# Patient Record
Sex: FEMALE | Race: WHITE | NOT HISPANIC OR LATINO | Employment: FULL TIME | URBAN - METROPOLITAN AREA
[De-identification: names, ages, dates, MRNs, and addresses within clinical notes are randomized per-mention and may not be internally consistent; named-entity substitution may affect disease eponyms.]

---

## 2017-05-30 ENCOUNTER — APPOINTMENT (EMERGENCY)
Dept: RADIOLOGY | Facility: HOSPITAL | Age: 57
End: 2017-05-30
Payer: COMMERCIAL

## 2017-05-30 ENCOUNTER — HOSPITAL ENCOUNTER (EMERGENCY)
Facility: HOSPITAL | Age: 57
Discharge: HOME | End: 2017-05-30
Attending: EMERGENCY MEDICINE
Payer: COMMERCIAL

## 2017-05-30 VITALS
SYSTOLIC BLOOD PRESSURE: 193 MMHG | TEMPERATURE: 98 F | OXYGEN SATURATION: 96 % | RESPIRATION RATE: 20 BRPM | DIASTOLIC BLOOD PRESSURE: 93 MMHG | HEART RATE: 79 BPM | WEIGHT: 190 LBS

## 2017-05-30 DIAGNOSIS — M25.512 ACUTE PAIN OF LEFT SHOULDER: Primary | ICD-10-CM

## 2017-05-30 PROCEDURE — 99283 EMERGENCY DEPT VISIT LOW MDM: CPT

## 2017-05-30 PROCEDURE — 73060 X-RAY EXAM OF HUMERUS: CPT

## 2017-05-30 RX ORDER — IBUPROFEN 400 MG/1
400 TABLET ORAL ONCE
Status: COMPLETED | OUTPATIENT
Start: 2017-05-30 | End: 2017-05-30

## 2017-05-30 RX ORDER — OXYCODONE HYDROCHLORIDE AND ACETAMINOPHEN 5; 325 MG/1; MG/1
1 TABLET ORAL EVERY 6 HOURS PRN
Qty: 15 TABLET | Refills: 0 | Status: SHIPPED | OUTPATIENT
Start: 2017-05-30 | End: 2017-06-02

## 2017-05-30 RX ORDER — IBUPROFEN 400 MG/1
TABLET ORAL
Status: COMPLETED
Start: 2017-05-30 | End: 2017-05-30

## 2017-05-30 RX ORDER — DILTIAZEM HYDROCHLORIDE 240 MG/1
240 CAPSULE, COATED, EXTENDED RELEASE ORAL DAILY
COMMUNITY

## 2017-05-30 RX ADMIN — IBUPROFEN 400 MG: 400 TABLET ORAL at 09:28

## 2018-01-16 ENCOUNTER — ALLSCRIPTS OFFICE VISIT (OUTPATIENT)
Dept: OTHER | Facility: OTHER | Age: 58
End: 2018-01-16

## 2018-01-16 ENCOUNTER — GENERIC CONVERSION - ENCOUNTER (OUTPATIENT)
Dept: OTHER | Facility: OTHER | Age: 58
End: 2018-01-16

## 2018-01-17 NOTE — PROGRESS NOTES
Assessment   1  Hand dermatitis (692 9) (L30 9)    Plan    · Follow-up PRN Evaluation and Treatment  Follow-up  Status: Complete  Done:    [id number] 08:46AM    Discussion/Summary   Discussion Summary:    Hand dermatitis psoriasiform in nature question of a contact related process again will go ahead and treat with topical steroids follow-up if no improvement advised patient not to use Neosporin on the area  If this process keeps recurring will probably need to do patch testing to rule out potential contactant  Nothing else of concern noted on cursory exam       Chief Complaint   Chief Complaint Free Text Note Form: Patient is here for raw hands  Patient did want a gown  History of Present Illness   HPI: 27-year-old female had not seen for year and a half presents for follow-up for problems with her hand dermatitis  Patient notes that this process has been under control until recently over the last 2 weeks flared up has used some Neosporin and topical moisturizes creams without any improvement  No previous history of psoriasis no previous history of any other issue  No specific different activity that she can relate to the start of this process      Review of Systems   Complete Female Dermatology [facility] Patient:      Constitutional: Denies constitutional symptoms  Eyes: Denies eye symptoms  ENT:  denies ear symptoms, nasal symptoms, mouth or throat symptoms  Cardiovascular: Denies cardiovascular symptoms  Respiratory: Denies respiratory symptoms  Gastrointestinal: Denies gastrointestinal symptoms  Musculoskeletal: Denies musculoskeletal symptoms  Integumentary: Denies skin, hair and nail symptoms  Neurological: Denies neurologic symptoms  Psychiatric: Denies psychiatric symptoms  Endocrine: Denies endocrine symptoms  Hematologic/Lymphatic: Denies hematologic symptoms  Active Problems   1  Contact dermatitis due to chemicals (692 4) (L25 3)   2  Hand dermatitis (692 9) (L30 9)   3  Multiple acquired skin tags (701 9) (L91 8)   4  Multiple nevi (216 9) (D22 9)   5  Post-inflammatory hyperpigmentation (709 00) (L81 0)   6  Screening for skin condition (V82 0) (Z13 89)    Past Medical History   1  History of asthma (V12 69) (Z87 09)   2  History of hypercholesterolemia (V12 29) (Z86 39)   3  History of hypertension (V12 59) (Z86 79)   4  History of seasonal allergies (V15 09) (Z88 9)  Past Medical History Reviewed- Derm:    The past medical history was reviewed  Surgical History   1  History of Rectocele Repair  Surgical History Reviewed [facility]- D[facility]:    Surgical History reviewed      Family History   Mother    1  Family history of hypertension (V17 49) (Z82 49)  Family History Reviewed- Derm:    Family History was reviewed      Social History    · Former smoker (O24 75) (F86 038)  Social History Reviewed [facility]- Derm: The social history was reviewed      Current Meds    1  DilTIAZem HCl TABS; Therapy: (Recorded:23Nov2015) to Recorded   2  Fluocinonide-E 0 05 % CREA; APPLY TO AFFECTED AREA SPARINGLY TWICE A DAY     hands; Therapy: [id number] to (Last Rx:02Nov2016)  Requested for: [id number] Ordered  Medication List Reviewed: The medication list was reviewed and updated today  Allergies   1  No Known Drug Allergies    Physical Exam        Constitutional      General appearance: Appears healthy and well developed  Lymphatic      No visible disturbance  Musculoskeletal      Digits and nails: No clubbing, cyanosis or edema  Cutaneous and nail exam normal        Skin      Head: Normal turgor, no rashes, no lesions  Left upper extremity: Abnormal        Neuro/Psych      Alert and oriented x 3  Displays comfort and cooperation during encounterl  Affect is normal        Finding Erythema scaling well-demarcated patches noted on the left dorsum of the hand no other evidence of rash elsewhere        Signatures    Electronically signed by : JONI Moon ; Jan 16 2018  8:48AM EST                       (Author)

## 2018-06-12 ENCOUNTER — HOSPITAL ENCOUNTER (EMERGENCY)
Facility: HOSPITAL | Age: 58
Discharge: HOME | End: 2018-06-12
Attending: EMERGENCY MEDICINE | Admitting: EMERGENCY MEDICINE
Payer: COMMERCIAL

## 2018-06-12 VITALS
HEIGHT: 66 IN | TEMPERATURE: 98.5 F | SYSTOLIC BLOOD PRESSURE: 150 MMHG | RESPIRATION RATE: 17 BRPM | HEART RATE: 88 BPM | WEIGHT: 189.4 LBS | DIASTOLIC BLOOD PRESSURE: 100 MMHG | OXYGEN SATURATION: 97 % | BODY MASS INDEX: 30.44 KG/M2

## 2018-06-12 DIAGNOSIS — S91.331A: Primary | ICD-10-CM

## 2018-06-12 PROCEDURE — 90471 IMMUNIZATION ADMIN: CPT

## 2018-06-12 PROCEDURE — 90715 TDAP VACCINE 7 YRS/> IM: CPT | Performed by: PHYSICIAN ASSISTANT

## 2018-06-12 PROCEDURE — 99283 EMERGENCY DEPT VISIT LOW MDM: CPT

## 2018-06-12 RX ADMIN — TETANUS TOXOID, REDUCED DIPHTHERIA TOXOID AND ACELLULAR PERTUSSIS VACCINE, ADSORBED 0.5 ML: 5; 2.5; 8; 8; 2.5 SUSPENSION INTRAMUSCULAR at 21:45

## 2018-06-13 NOTE — ED PROVIDER NOTES
History  Chief Complaint   Patient presents with    Foot Injury     Pt reports stepping on an old nail at home, would like to receive tetanus shot  59-year-old female with foot injury  States prior to arrival a sergo nail punctured through her shoe and nicked the skin, right foot  There is no bleeding afterwards  She washed out afterwards  She has had no redness swelling or pain since that time  She states she is only here because she wants a tetanus shot  She does not know when she last had her tetanus shot  History provided by:  Patient   used: No    Foot Injury - Major   Location:  Foot  Time since incident:  2 hours  Injury: yes    Foot location:  Sole of R foot  Pain details:     Severity:  No pain  Chronicity:  New  Dislocation: no    Foreign body present:  No foreign bodies  Tetanus status:  Unknown  Prior injury to area:  No  Relieved by:  Nothing  Worsened by:  Nothing  Ineffective treatments:  None tried  Associated symptoms: no decreased ROM, no fatigue, no fever, no itching, no muscle weakness, no neck pain, no numbness, no stiffness, no swelling and no tingling    Risk factors: no concern for non-accidental trauma, no frequent fractures, no obesity and no recent illness        Prior to Admission Medications   Prescriptions Last Dose Informant Patient Reported? Taking?   diltiazem (CARDIZEM CD) 240 mg 24 hr capsule   Yes Yes   Sig: Take 240 mg by mouth daily   metoprolol tartrate (LOPRESSOR) 25 mg tablet   Yes Yes   Sig: Take 25 mg by mouth daily      Facility-Administered Medications: None       Past Medical History:   Diagnosis Date    Hypertension        History reviewed  No pertinent surgical history  History reviewed  No pertinent family history  I have reviewed and agree with the history as documented      Social History   Substance Use Topics    Smoking status: Never Smoker    Smokeless tobacco: Never Used    Alcohol use No        Review of Systems Constitutional: Negative for activity change, appetite change, chills, diaphoresis, fatigue, fever and unexpected weight change  Eyes: Negative for photophobia and visual disturbance  Respiratory: Negative for apnea, cough, choking, chest tightness, shortness of breath, wheezing and stridor  Cardiovascular: Negative for chest pain, palpitations and leg swelling  Genitourinary: Negative for decreased urine volume, difficulty urinating, dysuria, enuresis, flank pain, frequency, hematuria and urgency  Musculoskeletal: Negative for joint swelling, myalgias, neck pain, neck stiffness and stiffness  Skin: Negative for color change, itching, pallor, rash and wound  Allergic/Immunologic: Negative  Hematological: Negative  Psychiatric/Behavioral: Negative  All other systems reviewed and are negative  Physical Exam  Physical Exam   Constitutional: She is oriented to person, place, and time  She appears well-developed and well-nourished  Non-toxic appearance  She does not have a sickly appearance  She does not appear ill  No distress  HENT:   Head: Normocephalic and atraumatic  Eyes: EOM and lids are normal  Pupils are equal, round, and reactive to light  Neck: Normal range of motion  Neck supple  Cardiovascular: Normal rate, regular rhythm, S1 normal, S2 normal, normal heart sounds, intact distal pulses and normal pulses  Exam reveals no gallop, no distant heart sounds, no friction rub and no decreased pulses  No murmur heard  Pulses:       Radial pulses are 2+ on the right side, and 2+ on the left side  Pulmonary/Chest: Effort normal and breath sounds normal  No accessory muscle usage  No apnea, no tachypnea and no bradypnea  No respiratory distress  She has no decreased breath sounds  She has no wheezes  She has no rhonchi  She has no rales  Abdominal: Normal appearance  There is no rigidity  Musculoskeletal: Normal range of motion   She exhibits no edema, tenderness or deformity  Right foot: Normal  There is normal range of motion, no tenderness, no bony tenderness and no swelling  Normal examination right foot the exception of his very superficial wound  Does not penetrate skin  Appears more like an abrasion  Neurological: She is alert and oriented to person, place, and time  No cranial nerve deficit  GCS eye subscore is 4  GCS verbal subscore is 5  GCS motor subscore is 6  Skin: Skin is warm, dry and intact  No rash noted  She is not diaphoretic  No erythema  No pallor  Psychiatric: Her speech is normal    Nursing note and vitals reviewed  Vital Signs  ED Triage Vitals [06/12/18 2126]   Temperature Pulse Respirations Blood Pressure SpO2   98 5 °F (36 9 °C) 88 17 150/100 97 %      Temp Source Heart Rate Source Patient Position - Orthostatic VS BP Location FiO2 (%)   Oral Monitor Lying Right arm --      Pain Score       2           Vitals:    06/12/18 2126   BP: 150/100   Pulse: 88   Patient Position - Orthostatic VS: Lying       Visual Acuity      ED Medications  Medications   tetanus-diphtheria-acellular pertussis (BOOSTRIX) IM injection 0 5 mL (0 5 mL Intramuscular Given 6/12/18 2145)       Diagnostic Studies  Results Reviewed     None                 No orders to display              Procedures  Procedures       Phone Contacts  ED Phone Contact    ED Course                               MDM  Number of Diagnoses or Management Options  Nail wound of right foot: new and requires workup  Risk of Complications, Morbidity, and/or Mortality  Presenting problems: low  Management options: low  General comments: 26-year-old here for tetanus vaccination  Exam benign  No further workup necessary at this time  Discussed basic wound care and follow-up  Patient agrees with plan  She was given tetanus  Stable for discharge home and outpatient follow-up  Given the minimal injury do not feel prophylactic antibiotic is necessary at this time      Patient Progress  Patient progress: stable    CritCare Time    Disposition  Final diagnoses:   Nail wound of right foot     Time reflects when diagnosis was documented in both MDM as applicable and the Disposition within this note     Time User Action Codes Description Comment    6/12/2018  9:42 PM Kamari Hendrix [W42 360Y] Nail wound of right foot       ED Disposition     ED Disposition Condition Comment    Discharge  Leelee Adams 81 discharge to home/self care  Condition at discharge: Good        Follow-up Information     Follow up With Specialties Details Why Contact Info    Avelina Wynne MD  Call  [address] [facility] [address] [facility]       Avelina Wynne MD  Call As needed [address] [facility] [address]  [phone]            Discharge Medication List as of 6/12/2018  9:46 PM      CONTINUE these medications which have NOT CHANGED    Details   diltiazem (CARDIZEM CD) 240 mg 24 hr capsule Take 240 mg by mouth daily, Until Discontinued, Historical Med      metoprolol tartrate (LOPRESSOR) 25 mg tablet Take 25 mg by mouth daily, Until Discontinued, Historical Med           No discharge procedures on file      ED Provider  Electronically Signed by           Rhett Marrufo PA-C  06/13/18 0022

## 2018-06-13 NOTE — DISCHARGE INSTRUCTIONS
Return to the Emergency Department sooner if increased pain, swelling, numbness, weakness, vomiting, difficulty breathing, redness  Ice, elevate  Puncture Wound   WHAT YOU NEED TO KNOW:   A puncture wound is a hole in the skin made by a sharp, pointed object  DISCHARGE INSTRUCTIONS:   Seek care immediately if:   · You have severe pain  · You have numbness or tingling in the area of your wound  · Your wound starts bleeding and does not stop, even after you apply pressure  Contact your healthcare provider if:   · You have new drainage or a bad odor coming from the wound  · You have a fever  · You have increased swelling, redness, or pain  · You have red streaks on your skin coming from your wound  · You have questions or concerns about your condition or care  Medicines: You may need any of the following:  · NSAIDs , such as ibuprofen, help decrease swelling, pain, and fever  This medicine is available with or without a doctor's order  NSAIDs can cause stomach bleeding or kidney problems in certain people  If you take blood thinner medicine, always ask your healthcare provider if NSAIDs are safe for you  Always read the medicine label and follow directions  · Antibiotics  help treat a bacterial infection  · Take your medicine as directed  Contact your healthcare provider if you think your medicine is not helping or if you have side effects  Tell him of her if you are allergic to any medicine  Keep a list of the medicines, vitamins, and herbs you take  Include the amounts, and when and why you take them  Bring the list or the pill bottles to follow-up visits  Carry your medicine list with you in case of an emergency  Wound care:  Keep your wound clean and dry  When you are allowed to bathe, carefully wash the wound with soap and water  Dry the area and put on new, clean bandages as directed  Change your bandages when they get wet or dirty    Manage your symptoms:   · Rest  your injured area as much as possible  If the puncture wound is in your leg or foot, use crutches as directed  This will help keep the weight off your injured leg or foot as it heals  · Elevate  your injured area above the level of your heart as often as you can  This will help decrease swelling and pain  Prop your injured area on pillows or blankets to keep it elevated comfortably  Follow up with your healthcare provider in 2 to 3 days:  Write down your questions so you remember to ask them during your visits  © 2017 [employer]  Information is for End User's use only and may not be sold, redistributed or otherwise used for commercial purposes  All illustrations and images included in CareNotes® are the copyrighted property of A [employer]  or [employer] [place]  The above information is an  only  It is not intended as medical advice for individual conditions or treatments  Talk to your doctor, nurse or pharmacist before following any medical regimen to see if it is safe and effective for you

## 2019-02-11 RX ORDER — FLUOCINONIDE CREAM (EMULSIFIED BASE) 0.5 MG/G
CREAM TOPICAL
Qty: 30 G | Refills: 1 | OUTPATIENT
Start: 2019-02-11

## 2019-03-07 ENCOUNTER — OFFICE VISIT (OUTPATIENT)
Dept: DERMATOLOGY | Facility: CLINIC | Age: 59
End: 2019-03-07
Payer: COMMERCIAL

## 2019-03-07 DIAGNOSIS — L30.8 PSORIASIFORM DERMATITIS: Primary | ICD-10-CM

## 2019-03-07 PROCEDURE — 99213 OFFICE O/P EST LOW 20 MIN: CPT | Performed by: DERMATOLOGY

## 2019-03-07 RX ORDER — DIPHENOXYLATE HYDROCHLORIDE AND ATROPINE SULFATE 2.5; .025 MG/1; MG/1
1 TABLET ORAL DAILY
COMMUNITY

## 2019-03-07 NOTE — PROGRESS NOTES
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 hand continues to have problems patient notes that it was improved for quite a while when she used a topical steroid however now she that she ran out of medication is flaring again no other rashes anywhere else on her body no other history of psoriasis or eczema  Past Medical History:   Diagnosis Date    Hypertension      No past surgical history on file  Social History   Social History     Substance and Sexual Activity   Alcohol Use No     Social History     Substance and Sexual Activity   Drug Use No     Social History     Tobacco Use   Smoking Status Never Smoker   Smokeless Tobacco Never Used     No family history on file  Meds/Allergies   No Known Allergies    Meds:  Prior to Admission medications    Medication Sig Start Date End Date Taking? Authorizing Provider   diltiazem (CARDIZEM CD) 240 mg 24 hr capsule Take 240 mg by mouth daily   Yes Historical Provider, MD   fluticasone-salmeterol (ADVAIR) 100-50 mcg/dose inhaler Inhale 1 puff 2 (two) times a day Rinse mouth after use     Yes Historical Provider, MD   metoprolol tartrate (LOPRESSOR) 25 mg tablet Take 25 mg by mouth daily   Yes Historical Provider, MD   multivitamin (THERAGRAN) TABS Take 1 tablet by mouth daily   Yes Historical Provider, MD       Subjective:     Review of Systems:    General: negative for - chills, fatigue, fever,  weight gain or weight loss  Psychological: negative for - anxiety, behavioral disorder, concentration difficulties, decreased libido, depression, irritability, memory difficulties, mood swings, sleep disturbances or suicidal ideation  ENT: negative for - hearing difficulties , nasal congestion, nasal discharge, oral lesions, sinus pain, sneezing, sore throat  Allergy and Immunology: negative for - hives, insect bite sensitivity,  Hematological and Lymphatic: negative for - bleeding problems, blood clots,bruising, swollen lymph nodes  Endocrine: negative for - hair pattern changes, hot flashes, malaise/lethargy, mood swings, palpitations, polydipsia/polyuria, skin changes, temperature intolerance or unexpected weight change  Respiratory: negative for - cough, hemoptysis, orthopnea, shortness of breath, or wheezing  Cardiovascular: negative for - chest pain, dyspnea on exertion, edema,  Gastrointestinal: negative for - abdominal pain, nausea/vomiting  Genito-Urinary: negative for - dysuria, incontinence, irregular/heavy menses or urinary frequency/urgency  Musculoskeletal: negative for - gait disturbance, joint pain, joint stiffness, joint swelling, muscle pain, muscular weakness  Dermatological:  As in HPI  Neurological: negative for confusion, dizziness, headaches, impaired coordination/balance, memory loss, numbness/tingling, seizures, speech problems, tremors or weakness       Objective:   LMP  (LMP Unknown)     Physical Exam:    General Appearance:    Alert, cooperative, no distress   Head:    Normocephalic, without obvious abnormality, atraumatic           Skin:   A full skin exam was performed including scalp, head scalp, eyes, ears, nose, lips, neck, chest, axilla, abdomen, back, buttocks, bilateral upper extremities, bilateral lower extremities, hands, feet, fingers, toes, fingernails, and toenails scaling erythematous well-demarcated patches mainly on the dorsum of left hand no other evidence of rash elsewhere     Assessment:     1  Psoriasiform dermatitis           Plan:    At present will refill the topical steroid we gave patient previously on also would schedule patch testing area looks more typical of a contact type reaction since mostly on the dorsum of the left hand    Ace Pretty MD  3/7/2019,12:14 PM    Portions of the record may have been created with voice recognition software   Occasional wrong word or "sound a like" substitutions may have occurred due to the inherent limitations of voice recognition software   Read the chart carefully and recognize, using context, where substitutions have occurred

## 2019-03-07 NOTE — PATIENT INSTRUCTIONS
At present will refill the topical steroid we gave patient previously on also would schedule patch testing area looks more typical of a contact type reaction since mostly on the dorsum of the left hand

## 2019-11-06 DIAGNOSIS — L30.8 PSORIASIFORM DERMATITIS: ICD-10-CM

## 2022-09-20 PROBLEM — J30.9 ALLERGIC RHINITIS: Status: ACTIVE | Noted: 2022-09-20

## 2022-09-20 PROBLEM — E66.811 CLASS 1 OBESITY DUE TO EXCESS CALORIES WITHOUT SERIOUS COMORBIDITY WITH BODY MASS INDEX (BMI) OF 30.0 TO 30.9 IN ADULT: Status: ACTIVE | Noted: 2017-12-13

## 2022-09-20 PROBLEM — R09.89 LABILE HYPERTENSION: Status: ACTIVE | Noted: 2017-12-13

## 2022-09-20 PROBLEM — E66.09 CLASS 1 OBESITY DUE TO EXCESS CALORIES WITHOUT SERIOUS COMORBIDITY WITH BODY MASS INDEX (BMI) OF 30.0 TO 30.9 IN ADULT: Status: ACTIVE | Noted: 2017-12-13

## 2022-09-20 PROBLEM — H10.13 ALLERGIC CONJUNCTIVITIS OF BOTH EYES: Status: ACTIVE | Noted: 2022-09-20

## 2022-09-20 PROBLEM — M25.562 PAIN IN LEFT KNEE: Status: ACTIVE | Noted: 2022-09-20

## 2022-09-20 PROBLEM — J45.40 MODERATE PERSISTENT ASTHMA WITHOUT COMPLICATION: Status: ACTIVE | Noted: 2022-09-20

## 2022-09-20 PROBLEM — K21.9 GERD (GASTROESOPHAGEAL REFLUX DISEASE): Status: ACTIVE | Noted: 2021-12-06

## 2022-09-20 PROBLEM — E78.5 HYPERLIPIDEMIA: Status: ACTIVE | Noted: 2017-12-13

## 2022-11-19 PROBLEM — H10.13 ALLERGIC CONJUNCTIVITIS OF BOTH EYES: Status: RESOLVED | Noted: 2022-09-20 | Resolved: 2022-11-19

## 2024-05-06 ENCOUNTER — EVALUATION (OUTPATIENT)
Dept: PHYSICAL THERAPY | Facility: CLINIC | Age: 64
End: 2024-05-06
Payer: COMMERCIAL

## 2024-05-06 DIAGNOSIS — M75.102 ROTATOR CUFF TEAR, NON-TRAUMATIC, LEFT: ICD-10-CM

## 2024-05-06 DIAGNOSIS — M54.12 CERVICAL RADICULOPATHY: Primary | ICD-10-CM

## 2024-05-06 PROCEDURE — 97161 PT EVAL LOW COMPLEX 20 MIN: CPT

## 2024-05-06 PROCEDURE — 97140 MANUAL THERAPY 1/> REGIONS: CPT

## 2024-05-06 PROCEDURE — 97110 THERAPEUTIC EXERCISES: CPT

## 2024-05-06 NOTE — PROGRESS NOTES
PT Evaluation     Today's date: 2024  Patient name: Elva Lee  : 1960  MRN: [id number]  Referring provider: Aneudy Abad MD  Dx:   Encounter Diagnosis     ICD-10-CM    1. Cervical radiculopathy  M54.12       2. Rotator cuff tear, non-traumatic, left  M75.102           Start Time: 1545  Stop Time: 1630  Total time in clinic (min): 45 minutes    Assessment  Assessment details: Patient is a 63 y.o. female who presents to physical therapy with c/o neck pain that refers down the left arm consistent with cervical radiculopathy. Patient presents to evaluation with pain, decreased range of motion, decreased strength, and decreased tolerance to activity. Patient demonstrates good tolerance to treatment and was provided with a written copy of their initial home exercise program focusing on postural strengthening and stretching and was encouraged to perform daily per tolerance. I discussed risks, benefits, and alternatives to treatment, and answered all patient questions to patient satisfaction. Patient presents with baseline FOTO score of 53 indicating limited tolerance/ability to complete ADLs. Patient is an appropriate candidate for skilled PT and would benefit from skilled PT services to address the aforementioned impairments, achieve goals, maximize function, and improve quality of life. Pt is in agreement with this plan.    Patient Education: activity modifications as needed, pacing of activities, importance of HEP compliance, PT prognosis/POC    Impairments: abnormal or restricted ROM, activity intolerance, impaired physical strength, lacks appropriate home exercise program and pain with function    Goals  ST weeks  Pt will decrease pain to 5/10    Pt will demonstrate improved postural awareness and ability to self-correct without reliance on external cues     LT weeks  Pt will improve FOTO score to > or = to 69 to indicate improved functional abilities   Pt will decrease pain to 1-2/10  "  Pt will improve AROM to WNL for improved tolerance with ADLS  Pt will be able to tolerate sitting for 30 minutes without symptoms.                   Plan  Patient would benefit from: PT eval and skilled physical therapy  Planned modality interventions: cryotherapy, thermotherapy: hydrocollator packs and TENS  Planned therapy interventions: manual therapy, graded motor, graded exercise, graded activity, flexibility, functional ROM exercises, stretching, strengthening, therapeutic activities, therapeutic exercise, therapeutic training and home exercise program  Frequency: 2x week  Plan of Care beginning date: 2024  Plan of Care expiration date: 2024  Treatment plan discussed with: patient      Subjective Evaluation    History of Present Illness  Mechanism of injury: Pt is a 64 y/o female who presents to therapy with c/o of left sided neck pain that refers down the arm. Patient reports pain has been present for 2 weeks following a workout at the gym. She was doing an overhead workout that she thinks may have irritated it. She notes it is pain that shoots down the arm. She notes she gets feelings that the arm is bulky and hard. She notes her arm will \"get stuck\" in a certain position especially when she is sleeping at night. She reports she gets numbness down the arm occasionally. She had an XRAY that showed arthritis. She has difficulty with turning her head nad other head movements.     Pain  Current pain rating: 10  At best pain ratin  At worst pain rating: 10  Quality: sharp        Objective     General Comments:      Shoulder Comments   Posture: fwd head, rounded shoulders, increased kyphosis    Cervical ROM:  Flex- 20 (discomfort)   Ext- 15 (pain)  L rot- 55  R rot- 70   L lat flex - 40 (discomfort)   R lat flex - 40 (discomfort)    L AROM: WNL     L PROM: WNL     L shoulder strength: flex-5/5 abd-5/5 ER-5/5 IR-5/5    Sensation: intact/symmetrical    TTP: upper trap    Hawkin's-Mark: " "(+)  Empty-Can: (-)    Traction:  Spurling: (+)                Diagnosis: cervical radiculopathy/ RTC tear    Precautions: high blood pressure (measure bp)   POC Expires: 7/1/24   Re-evaluation Date: 6/3/24   FOTO Scores/Date: Goal -69; 5/6- 53   Visit Count 1/10       Manuals 5/6       TPR/SOR AM                       Ther Ex 5/6       UT/LS stretch 3x30\"/HEP       Scap retractions 2x10/HEP        Thoracic ext 5\" 10x                        Pt edu AM                               Neuro Re-Ed                                                               Ther Act                                                                                 Modalities                                            "

## 2024-05-06 NOTE — LETTER
May 6, 2024    Aneudy Abad MD  [address] [facility] [address]    Patient: Elva Lee   YOB: 1960   Date of Visit: 2024     Encounter Diagnosis     ICD-10-CM    1. Cervical radiculopathy  M54.12       2. Rotator cuff tear, non-traumatic, left  M75.102           Dear Dr. Abad:    Thank you for your recent referral of Elva Lee. Please review the attached evaluation summary from Elva's recent visit.     Please verify that you agree with the plan of care by signing the attached order.     If you have any questions or concerns, please do not hesitate to call.     I sincerely appreciate the opportunity to share in the care of one of your patients and hope to have another opportunity to work with you in the near future.       Sincerely,    Elba Cerrato, PT      Referring Provider:      I certify that I have read the below Plan of Care and certify the need for these services furnished under this plan of treatment while under my care.                    Aneudy Abad MD  [address] [facility] [address]  Via Fax: [phone]          PT Evaluation     Today's date: 2024  Patient name: Elva Lee  : 1960  MRN: [id number]  Referring provider: Aneudy Abad MD  Dx:   Encounter Diagnosis     ICD-10-CM    1. Cervical radiculopathy  M54.12       2. Rotator cuff tear, non-traumatic, left  M75.102           Start Time: 1545  Stop Time: 1630  Total time in clinic (min): 45 minutes    Assessment  Assessment details: Patient is a 63 y.o. female who presents to physical therapy with c/o neck pain that refers down the left arm consistent with cervical radiculopathy. Patient presents to evaluation with pain, decreased range of motion, decreased strength, and decreased tolerance to activity. Patient demonstrates good tolerance to treatment and was provided with a written copy of their initial home exercise program focusing on  postural strengthening and stretching and was encouraged to perform daily per tolerance. I discussed risks, benefits, and alternatives to treatment, and answered all patient questions to patient satisfaction. Patient presents with baseline FOTO score of 53 indicating limited tolerance/ability to complete ADLs. Patient is an appropriate candidate for skilled PT and would benefit from skilled PT services to address the aforementioned impairments, achieve goals, maximize function, and improve quality of life. Pt is in agreement with this plan.    Patient Education: activity modifications as needed, pacing of activities, importance of HEP compliance, PT prognosis/POC    Impairments: abnormal or restricted ROM, activity intolerance, impaired physical strength, lacks appropriate home exercise program and pain with function    Goals  ST weeks  Pt will decrease pain to 5/10    Pt will demonstrate improved postural awareness and ability to self-correct without reliance on external cues     LT weeks  Pt will improve FOTO score to > or = to 69 to indicate improved functional abilities   Pt will decrease pain to 1-2/10   Pt will improve AROM to WNL for improved tolerance with ADLS  Pt will be able to tolerate sitting for 30 minutes without symptoms.                   Plan  Patient would benefit from: PT eval and skilled physical therapy  Planned modality interventions: cryotherapy, thermotherapy: hydrocollator packs and TENS  Planned therapy interventions: manual therapy, graded motor, graded exercise, graded activity, flexibility, functional ROM exercises, stretching, strengthening, therapeutic activities, therapeutic exercise, therapeutic training and home exercise program  Frequency: 2x week  Plan of Care beginning date: 2024  Plan of Care expiration date: 2024  Treatment plan discussed with: patient      Subjective Evaluation    History of Present Illness  Mechanism of injury: Pt is a 62 y/o female who  "presents to therapy with c/o of left sided neck pain that refers down the arm. Patient reports pain has been present for 2 weeks following a workout at the gym. She was doing an overhead workout that she thinks may have irritated it. She notes it is pain that shoots down the arm. She notes she gets feelings that the arm is bulky and hard. She notes her arm will \"get stuck\" in a certain position especially when she is sleeping at night. She reports she gets numbness down the arm occasionally. She had an XRAY that showed arthritis. She has difficulty with turning her head nad other head movements.     Pain  Current pain rating: 10  At best pain ratin  At worst pain rating: 10  Quality: sharp        Objective     General Comments:      Shoulder Comments   Posture: fwd head, rounded shoulders, increased kyphosis    Cervical ROM:  Flex- 20 (discomfort)   Ext- 15 (pain)  L rot- 55  R rot- 70   L lat flex - 40 (discomfort)   R lat flex - 40 (discomfort)    L AROM: WNL     L PROM: WNL     L shoulder strength: flex-5/5 abd-5/5 ER-5/5 IR-5/5    Sensation: intact/symmetrical    TTP: upper trap    Hawkin's-Mark: (+)  Empty-Can: (-)    Traction:  Spurling: (+)                Diagnosis: cervical radiculopathy/ RTC tear    Precautions: high blood pressure (measure bp)   POC Expires: 24   Re-evaluation Date: 6/3/24   FOTO Scores/Date: Goal -69; - 53   Visit Count 1/10       Manuals        TPR/SOR AM                       Ther Ex        UT/LS stretch 3x30\"/HEP       Scap retractions 2x10/HEP        Thoracic ext 5\" 10x                        Pt edu AM                               Neuro Re-Ed                                                               Ther Act                                                                                 Modalities                                                            "

## 2024-05-09 ENCOUNTER — OFFICE VISIT (OUTPATIENT)
Dept: PHYSICAL THERAPY | Facility: CLINIC | Age: 64
End: 2024-05-09
Payer: COMMERCIAL

## 2024-05-09 DIAGNOSIS — M75.102 ROTATOR CUFF TEAR, NON-TRAUMATIC, LEFT: ICD-10-CM

## 2024-05-09 DIAGNOSIS — M54.12 CERVICAL RADICULOPATHY: Primary | ICD-10-CM

## 2024-05-09 PROCEDURE — 97110 THERAPEUTIC EXERCISES: CPT

## 2024-05-09 NOTE — PROGRESS NOTES
"Daily Note     Today's date: 2024  Patient name: Elva Lee  : 1960  MRN: [id number]  Referring provider: Aneudy Abad MD  Dx:   Encounter Diagnosis     ICD-10-CM    1. Cervical radiculopathy  M54.12       2. Rotator cuff tear, non-traumatic, left  M75.102           Start Time: 1800  Stop Time: 1830  Total time in clinic (min): 30 minutes    Subjective: pt reports pain in the shoulder that irritates her most of the day.       Objective: See treatment diary below      Assessment: Attempted to perform manual therapy but too much pain to continue. Added in range of motion and postural strengthening exercises started this session. Poor tolerance to therapy due to pain. Will progress as tolerated next session. Patient would benefit from continued PT      Plan: Continue per plan of care.      Diagnosis: cervical radiculopathy/ RTC tear    Precautions: high blood pressure (measure bp)   POC Expires: 24   Re-evaluation Date: 6/3/24   FOTO Scores/Date: Goal -69; - 53   Visit Count 10 2/10      Manuals       TPR/SOR AM held                      Ther Ex       UT/LS stretch 3x30\"/HEP 3x30\"       Scap retractions 2x10/HEP  2x10       Thoracic ext 5\" 10x  5\" 10x       Cervical range of motion   20x ea dir              Pt edu AM                               Neuro Re-Ed         Rows/ext  2x10 rtb       Horizontal abd        B/l ER                                        Ther Act                                                                                 Modalities                                            "

## 2024-05-13 ENCOUNTER — APPOINTMENT (OUTPATIENT)
Dept: PHYSICAL THERAPY | Facility: CLINIC | Age: 64
End: 2024-05-13
Payer: COMMERCIAL

## 2024-05-16 ENCOUNTER — OFFICE VISIT (OUTPATIENT)
Dept: PHYSICAL THERAPY | Facility: CLINIC | Age: 64
End: 2024-05-16
Payer: COMMERCIAL

## 2024-05-16 DIAGNOSIS — M54.12 CERVICAL RADICULOPATHY: Primary | ICD-10-CM

## 2024-05-16 PROCEDURE — 97110 THERAPEUTIC EXERCISES: CPT

## 2024-05-16 PROCEDURE — 97112 NEUROMUSCULAR REEDUCATION: CPT

## 2024-05-16 NOTE — PROGRESS NOTES
"Daily Note     Today's date: 2024  Patient name: Elva Lee  : 1960  MRN: [id number]  Referring provider: Aneudy Abad MD  Dx:   Encounter Diagnosis     ICD-10-CM    1. Cervical radiculopathy  M54.12           Start Time: 1800  Stop Time: 1836  Total time in clinic (min): 36 minutes    Subjective: patient reports no new complaints or incidents.  Reports no change in status.       Objective: See treatment diary below      Assessment: patient was able to complete therapy within tolerance.  Cueing on working pain free intensities and ROM. Required cueing throughout on sequencing and corrective mechanics of exercises.  Was able to progress scap/postural re-ed with introduction of UBE and BL horizontal abd and ER.  Denies increase of pain with performance      Plan: Continue per plan of care.  Progress treatment as tolerated.       Diagnosis: cervical radiculopathy/ RTC tear    Precautions: high blood pressure (measure bp)   POC Expires: 24   Re-evaluation Date: 6/3/24   FOTO Scores/Date: Goal -69; - 53   Visit Count 1/10 2/10 3/10     Manuals      TPR/SOR AM held                      Ther Ex      UT/LS stretch 3x30\"/HEP 3x30\"  30\"x3 ea      Scap retractions 2x10/HEP  2x10  5\" 2x10      Thoracic ext 5\" 10x  5\" 10x  5\"x20      Cervical range of motion   20x ea dir X20 ea              Pt edu AM                               Neuro Re-Ed       Rows/ext  2x10 rtb  RTB 2x10      Horizontal abd   RTB 2x10      B/l ER    RTB 2x10      UBE Retro for postural Re-ed   L1 x 5 mins                            Ther Act                                                                                 Modalities                                              "

## 2024-05-23 ENCOUNTER — OFFICE VISIT (OUTPATIENT)
Dept: PHYSICAL THERAPY | Facility: CLINIC | Age: 64
End: 2024-05-23
Payer: COMMERCIAL

## 2024-05-23 DIAGNOSIS — M75.102 ROTATOR CUFF TEAR, NON-TRAUMATIC, LEFT: ICD-10-CM

## 2024-05-23 DIAGNOSIS — M54.12 CERVICAL RADICULOPATHY: Primary | ICD-10-CM

## 2024-05-23 PROCEDURE — 97112 NEUROMUSCULAR REEDUCATION: CPT

## 2024-05-23 PROCEDURE — 97110 THERAPEUTIC EXERCISES: CPT

## 2024-05-23 NOTE — PROGRESS NOTES
"Daily Note     Today's date: 2024  Patient name: Elva Lee  : 1960  MRN: [id number]  Referring provider: Aneudy Abad MD  Dx:   Encounter Diagnosis     ICD-10-CM    1. Cervical radiculopathy  M54.12       2. Rotator cuff tear, non-traumatic, left  M75.102           Start Time: 1800  Stop Time: 1840  Total time in clinic (min): 40 minutes    Subjective: pt reports she went for a haircut the other day and leaning back at the salon bothered her.       Objective: See treatment diary below      Assessment: Pain with thoracic extensions, exercise held this session. Educated patient on importance of posture when working on the computer. Cued patient throughout treatment for good posture during exercise completion. Progress next session as tolerated Tolerated treatment well. Patient would benefit from continued PT      Plan: Continue per plan of care.      Diagnosis: cervical radiculopathy/ RTC tear    Precautions: high blood pressure (measure bp)   POC Expires: 24   Re-evaluation Date: 6/3/24   FOTO Scores/Date: Goal -69; -    Visit Count /10 2/10 3/10 4/10    Manuals     TPR/SOR AM held                      Ther Ex      UT/LS stretch 3x30\"/HEP 3x30\"  30\"x3 ea  30\" 3x     Scap retractions 2x10/HEP  2x10  5\" 2x10  5\" 2x10     Thoracic ext 5\" 10x  5\" 10x  5\"x20  Held     Cervical range of motion   20x ea dir X20 ea  X20 ea dir             Pt edu AM   AM                            Neuro Re-Ed       Rows/ext  2x10 rtb  RTB 2x10  Rtb 2x10     Horizontal abd   RTB 2x10  Rtb 2x10    B/l ER    RTB 2x10  Rtb 2x10     UBE Retro for postural Re-ed   L1 x 5 mins L1 x 5 min                            Ther Act                                                                                 Modalities                                                "

## 2024-05-30 ENCOUNTER — APPOINTMENT (OUTPATIENT)
Dept: PHYSICAL THERAPY | Facility: CLINIC | Age: 64
End: 2024-05-30
Payer: COMMERCIAL

## 2024-05-30 NOTE — PROGRESS NOTES
"Daily Note     Today's date: 2024  Patient name: Elva Lee  : 1960  MRN: [id number]  Referring provider: Aneudy Abad MD  Dx: No diagnosis found.               Subjective: pt reports      Objective: See treatment diary below      Assessment: Tolerated treatment well. Patient would benefit from continued PT.      Plan: Continue per plan of care.      Diagnosis: cervical radiculopathy/ RTC tear    Precautions: high blood pressure (measure bp)   POC Expires: 24   Re-evaluation Date: 6/3/24   FOTO Scores/Date: ;    Visit Count 1/10 2/10 3/10 4/10 5/10   Manuals    TPR/SOR AM held                      Ther Ex    UT/LS stretch 3x30\"/HEP 3x30\"  30\"x3 ea  30\" 3x     Scap retractions 2x10/HEP  2x10  5\" 2x10  5\" 2x10     Thoracic ext 5\" 10x  5\" 10x  5\"x20  Held     Cervical range of motion   20x ea dir X20 ea  X20 ea dir             Pt edu AM   AM                            Neuro Re-Ed     Rows/ext  2x10 rtb  RTB 2x10  Rtb 2x10     Horizontal abd   RTB 2x10  Rtb 2x10    B/l ER    RTB 2x10  Rtb 2x10     UBE Retro for postural Re-ed   L1 x 5 mins L1 x 5 min                            Ther Act                                                                                 Modalities                                                  "

## 2024-12-27 ENCOUNTER — OFFICE VISIT (OUTPATIENT)
Dept: URGENT CARE | Facility: CLINIC | Age: 64
End: 2024-12-27
Payer: COMMERCIAL

## 2024-12-27 VITALS
TEMPERATURE: 97 F | SYSTOLIC BLOOD PRESSURE: 168 MMHG | HEART RATE: 104 BPM | OXYGEN SATURATION: 94 % | RESPIRATION RATE: 18 BRPM | DIASTOLIC BLOOD PRESSURE: 98 MMHG

## 2024-12-27 DIAGNOSIS — B96.89 ACUTE BACTERIAL RHINOSINUSITIS: Primary | ICD-10-CM

## 2024-12-27 DIAGNOSIS — J01.90 ACUTE BACTERIAL RHINOSINUSITIS: Primary | ICD-10-CM

## 2024-12-27 PROBLEM — I10 PRIMARY HYPERTENSION: Status: ACTIVE | Noted: 2024-02-21

## 2024-12-27 PROBLEM — J45.21 MILD INTERMITTENT ASTHMA WITH ACUTE EXACERBATION: Status: ACTIVE | Noted: 2017-12-13

## 2024-12-27 PROBLEM — D18.03 HEMANGIOMA OF SPLEEN: Status: ACTIVE | Noted: 2024-11-22

## 2024-12-27 PROCEDURE — 99213 OFFICE O/P EST LOW 20 MIN: CPT

## 2024-12-27 RX ORDER — AZITHROMYCIN 250 MG/1
TABLET, FILM COATED ORAL
Qty: 6 TABLET | Refills: 0 | Status: SHIPPED | OUTPATIENT
Start: 2024-12-27 | End: 2025-01-01

## 2024-12-27 RX ORDER — LOSARTAN POTASSIUM 25 MG/1
TABLET ORAL
COMMUNITY

## 2024-12-28 NOTE — PATIENT INSTRUCTIONS
Take all of the antibiotics   Follow up with PCP/cardiology for PCP  Go see your regular family doctor in 3-5 days.  Go to emergency room (ER) if you are getting worse.

## 2024-12-28 NOTE — PROGRESS NOTES
[facility]    NAME: Elva Lee is a 64 y.o. female  : 1960    MRN: [id number]  DATE: 2024  TIME: 7:59 PM    Assessment and Plan   Acute bacterial rhinosinusitis [J01.90, B96.89]  1. Acute bacterial rhinosinusitis  azithromycin (ZITHROMAX) 250 mg tablet        Last 2 office visits with pulse ox 97%, versus todays 94%. Unable to do xray (no tech)  Given symptoms and pulse ox will start on antibiotics  Refilled inhaler  Noted elevated BP follow up with PCP or cardiology.   Follow up with primary care in 3-5 days.  Go to ER if symptoms get worse.     Patient Instructions     Take all of the antibiotics   Follow up with PCP/cardiology for PCP  Go see your regular family doctor in 3-5 days.  Go to emergency room (ER) if you are getting worse.     Chief Complaint     Chief Complaint   Patient presents with    Cold Like Symptoms     Verbalizes symptoms started on . Family also sick. C/o night sweats. Body aches, congestions and fatigue. Taking tylenol and coricidin.          History of Present Illness       Presents with 6 days of sick symptoms including night sweats, body aches, congestion and fatigue.  She is taking Tylenol Coricidin for symptoms.  Notes family members with similar sick symptoms.        Review of Systems   Review of Systems   Constitutional:  Positive for chills and fatigue. Negative for fever.   HENT:  Positive for congestion. Negative for sore throat.    Respiratory:  Positive for cough. Negative for shortness of breath and wheezing.    Cardiovascular:  Negative for chest pain.   Gastrointestinal:  Negative for abdominal pain.   Musculoskeletal:  Positive for myalgias.   Psychiatric/Behavioral:  Negative for confusion.          Current Medications       Current Outpatient Medications:     albuterol (PROVENTIL HFA,VENTOLIN HFA) 90 mcg/act inhaler, Inhale 2 puffs every 4 (four) hours as needed for shortness of breath, Disp: 18 g, Rfl: 1    azithromycin  (ZITHROMAX) 250 mg tablet, Take 2 tablets today then 1 tablet daily x 4 days, Disp: 6 tablet, Rfl: 0    fluticasone (FLONASE) 50 mcg/act nasal spray, 1 spray into each nostril daily, Disp: , Rfl:     Fluticasone-Salmeterol (Advair Diskus) 250-50 mcg/dose inhaler, Inhale 1 puff 2 (two) times a day Rinse mouth after use., Disp: 60 blister, Rfl: 11    Melatonin 5 MG TABS, Take 1 tablet by mouth every evening 1-2 tabs, Disp: , Rfl:     multivitamin IVPB, Take 1 tablet by mouth in the morning, Disp: , Rfl:     TURMERIC-GINGER PO, Take 3 tablets by mouth in the morning, Disp: , Rfl:     ascorbic acid (VITAMIN C) 500 MG tablet, Take 500 mg by mouth daily (Patient not taking: Reported on 12/27/2024), Disp: , Rfl:     cloNIDine (CATAPRES) 0.1 mg tablet, Take 1 tablet by mouth 2 (two) times a day, Disp: , Rfl:     diltiazem (CARDIZEM CD) 240 mg 24 hr capsule, Take 240 mg by mouth daily, Disp: , Rfl:     fluocinonide (LIDEX) 0.05 % cream, Apply topically 2 (two) times a day Applied to rash on hands till resolved, Disp: 30 g, Rfl: 2    losartan (COZAAR) 25 mg tablet, , Disp: , Rfl:     metoprolol tartrate (LOPRESSOR) 25 mg tablet, Take 25 mg by mouth daily, Disp: , Rfl:     multivitamin (THERAGRAN) TABS, Take 1 tablet by mouth daily, Disp: , Rfl:     Naproxen Sodium 220 MG CAPS, Take 1 tablet by mouth as needed, Disp: , Rfl:     omeprazole (PriLOSEC) 40 MG capsule, Take 1 capsule by mouth daily, Disp: , Rfl:     pyridoxine (B-6) 100 MG tablet, Take 100 mg by mouth daily, Disp: , Rfl:     rosuvastatin (CRESTOR) 5 mg tablet, Take 1 tablet by mouth in the morning (Patient not taking: Reported on 12/27/2024), Disp: , Rfl:     Current Allergies     Allergies as of 12/27/2024 - Reviewed 12/27/2024   Allergen Reaction Noted    Amlodipine Swelling 08/22/2017    Atenolol Swelling 01/24/2020    Lisinopril Swelling 08/22/2017            The following portions of the patient's history were reviewed and updated as appropriate: allergies,  current medications, past family history, past medical history, past social history, past surgical history and problem list.     Past Medical History:   Diagnosis Date    Asthma     Heartburn     Hypertension        Past Surgical History:   Procedure Laterality Date    HYSTERECTOMY      Feb 2021       History reviewed. No pertinent family history.      Medications have been verified.        Objective   /98   Pulse 104   Temp (!) 97 °F (36.1 °C)   Resp 18   LMP  (LMP Unknown)   SpO2 94%        Physical Exam     Physical Exam  Vitals reviewed.   Constitutional:       General: She is not in acute distress.     Appearance: Normal appearance.   HENT:      Right Ear: Tympanic membrane, ear canal and external ear normal.      Left Ear: Tympanic membrane, ear canal and external ear normal.      Nose: Nose normal.      Mouth/Throat:      Mouth: Mucous membranes are moist.      Pharynx: No posterior oropharyngeal erythema.   Eyes:      Conjunctiva/sclera: Conjunctivae normal.   Cardiovascular:      Rate and Rhythm: Normal rate and regular rhythm.      Pulses: Normal pulses.      Heart sounds: Normal heart sounds. No murmur heard.  Pulmonary:      Effort: Pulmonary effort is normal. No respiratory distress.      Breath sounds: Wheezing present.   Skin:     General: Skin is warm and dry.   Neurological:      General: No focal deficit present.      Mental Status: She is alert and oriented to person, place, and time.   Psychiatric:         Mood and Affect: Mood normal.         Behavior: Behavior normal.

## 2025-02-06 ENCOUNTER — OFFICE VISIT (OUTPATIENT)
Dept: CARDIOLOGY CLINIC | Facility: CLINIC | Age: 65
End: 2025-02-06
Payer: COMMERCIAL

## 2025-02-06 VITALS
HEIGHT: 66 IN | TEMPERATURE: 98 F | DIASTOLIC BLOOD PRESSURE: 104 MMHG | SYSTOLIC BLOOD PRESSURE: 186 MMHG | HEART RATE: 84 BPM | BODY MASS INDEX: 32.27 KG/M2 | OXYGEN SATURATION: 97 % | WEIGHT: 200.8 LBS

## 2025-02-06 DIAGNOSIS — I10 HYPERTENSION, UNSPECIFIED TYPE: Primary | ICD-10-CM

## 2025-02-06 DIAGNOSIS — E78.2 MIXED HYPERLIPIDEMIA: ICD-10-CM

## 2025-02-06 DIAGNOSIS — R73.03 PREDIABETES: ICD-10-CM

## 2025-02-06 PROCEDURE — 93000 ELECTROCARDIOGRAM COMPLETE: CPT | Performed by: INTERNAL MEDICINE

## 2025-02-06 PROCEDURE — 99204 OFFICE O/P NEW MOD 45 MIN: CPT | Performed by: INTERNAL MEDICINE

## 2025-02-06 RX ORDER — OLMESARTAN MEDOXOMIL AND HYDROCHLOROTHIAZIDE 20/12.5 20; 12.5 MG/1; MG/1
1 TABLET ORAL DAILY
Qty: 30 TABLET | Refills: 0 | Status: SHIPPED | OUTPATIENT
Start: 2025-02-06

## 2025-02-06 RX ORDER — LOSARTAN POTASSIUM 100 MG/1
100 TABLET ORAL DAILY
COMMUNITY
Start: 2024-08-14 | End: 2025-02-06 | Stop reason: ALTCHOICE

## 2025-02-06 RX ORDER — BUPROPION HYDROCHLORIDE 150 MG/1
1 TABLET ORAL EVERY MORNING
COMMUNITY
Start: 2025-02-04 | End: 2025-04-05

## 2025-02-06 NOTE — PATIENT INSTRUCTIONS
For high blood pressure we reviewed the following recommendations:  1) Discontinue Losartan  2) A new prescription for Olmesartan-Hydrochlorothiazide, please take 1 tablet daily  3) Low sodium diet less than 1.8 grams per day, exercise, weight loss  4) Treatment of sleep apnea

## 2025-05-12 ENCOUNTER — APPOINTMENT (OUTPATIENT)
Dept: RADIOLOGY | Facility: HOSPITAL | Age: 65
End: 2025-05-12
Payer: COMMERCIAL

## 2025-05-12 ENCOUNTER — HOSPITAL ENCOUNTER (EMERGENCY)
Facility: HOSPITAL | Age: 65
Discharge: HOME | End: 2025-05-12
Attending: EMERGENCY MEDICINE
Payer: COMMERCIAL

## 2025-05-12 ENCOUNTER — APPOINTMENT (EMERGENCY)
Dept: RADIOLOGY | Facility: HOSPITAL | Age: 65
End: 2025-05-12
Payer: COMMERCIAL

## 2025-05-12 ENCOUNTER — APPOINTMENT (EMERGENCY)
Dept: CT IMAGING | Facility: HOSPITAL | Age: 65
End: 2025-05-12
Payer: COMMERCIAL

## 2025-05-12 VITALS
DIASTOLIC BLOOD PRESSURE: 88 MMHG | RESPIRATION RATE: 18 BRPM | OXYGEN SATURATION: 98 % | HEART RATE: 98 BPM | SYSTOLIC BLOOD PRESSURE: 156 MMHG | TEMPERATURE: 98.4 F

## 2025-05-12 DIAGNOSIS — S32.512A CLOSED FRACTURE OF SUPERIOR RAMUS OF LEFT PUBIS, INITIAL ENCOUNTER (HCC): ICD-10-CM
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
## 2025-05-12 DIAGNOSIS — S32.592A CLOSED FRACTURE OF INFERIOR PUBIC RAMUS, LEFT, INITIAL ENCOUNTER (HCC): ICD-10-CM

## 2025-05-12 PROCEDURE — 70450 CT HEAD/BRAIN W/O DYE: CPT

## 2025-05-12 PROCEDURE — 73552 X-RAY EXAM OF FEMUR 2/>: CPT

## 2025-05-12 PROCEDURE — 99284 EMERGENCY DEPT VISIT MOD MDM: CPT | Performed by: EMERGENCY MEDICINE

## 2025-05-12 PROCEDURE — 99284 EMERGENCY DEPT VISIT MOD MDM: CPT

## 2025-05-12 PROCEDURE — 72192 CT PELVIS W/O DYE: CPT

## 2025-05-12 RX ORDER — OXYCODONE HYDROCHLORIDE 5 MG/1
5 TABLET ORAL EVERY 6 HOURS PRN
Qty: 15 TABLET | Refills: 0 | Status: SHIPPED | OUTPATIENT
Start: 2025-05-12 | End: 2025-05-22

## 2025-05-12 RX ORDER — OXYCODONE AND ACETAMINOPHEN 5; 325 MG/1; MG/1
1 TABLET ORAL ONCE
Refills: 0 | Status: COMPLETED | OUTPATIENT
Start: 2025-05-12 | End: 2025-05-12

## 2025-05-12 RX ADMIN — OXYCODONE HYDROCHLORIDE AND ACETAMINOPHEN 1 TABLET: 5; 325 TABLET ORAL at 16:51

## 2025-05-12 NOTE — ED PROVIDER NOTES
Time reflects when diagnosis was documented in both MDM as applicable and the Disposition within this note       Time User Action Codes Description Comment    5/12/2025  5:47 PM SabrasusanjimYumiko Add [W01.0XXA] Fall on same level from tripping     5/12/2025  5:47 PM Yumiko Sifuentes Add [S32.512A] Closed fracture of superior ramus of left pubis, initial encounter (HCC)     5/12/2025  5:47 PM Bear Yumiko NATE Add [S32.592A] Closed fracture of inferior pubic ramus, left, initial encounter (HCC)           ED Disposition       ED Disposition   Discharge    Condition   Stable    Date/Time   Mon May 12, 2025  5:47 PM    Comment   Elva Lee discharge to home/self care.                   Assessment & Plan       Medical Decision Making  64-year-old female presents to the ED status post trip and fall that occurred last night landing on her left side with major complaint of left hip, buttocks, groin pain with inability to bear weight on the left lower extremity since the injury.  She has had mild head strike as well but no LOC and no blood thinner use.  I recommended CT pelvis to assess for occult fracture given that we are unable to reproduce any tenderness and that she is unable to bear weight.  In triage, x-ray femur and pelvis were ordered and will still have x-rays done but patient is agreeable to pursuing CT.  Will also obtain CT head to rule out ICH given head strike.  Will provide Percocet for pain control.    Amount and/or Complexity of Data Reviewed  Radiology: ordered and independent interpretation performed. Decision-making details documented in ED Course.    Risk  Prescription drug management.        ED Course as of 05/12/25 1826   Mon May 12, 2025   1731 Updated patient about CT pelvis findings of left-sided inferior and superior pubic rami fracture.  Advised patient that this is a weightbearing as tolerated type of injury and usually nonoperative.  I did message on-call orthopedic surgeon, Dr. Mcintyre for  any further recommendations.  Patient has crutches and will test her on the crutches to make sure she is steady and able to use the crutches safely.  Will also send home with a walker for when she is able to start putting weight on her left leg.  Advised close follow-up with orthopedic surgery.  I also advised use of ibuprofen plus Tylenol every 6 hours and to save the oxycodone that I will prescribe for severe pain.  I did caution patient not to drive while taking oxycodone and that it can cause constipation so to start a stool softener or laxative if she develops constipation.   1735 Dr. Mcintyre confirmed patient can be WBAT and discharged if able to use crutches/walker.    1746 Patient ambulated with her crutches without difficulty and ED tech confirmed they are the appropriate size.  She was also provided a walker.  Discussed ED return parameters with patient.       Medications   oxyCODONE-acetaminophen (PERCOCET) 5-325 mg per tablet 1 tablet (1 tablet Oral Given 5/12/25 1651)       ED Risk Strat Scores                    No data recorded        SBIRT 20yo+      Flowsheet Row Most Recent Value   Initial Alcohol Screen: US AUDIT-C     1. How often do you have a drink containing alcohol? 0 Filed at: 05/12/2025 1345   2. How many drinks containing alcohol do you have on a typical day you are drinking?  0 Filed at: 05/12/2025 1345   3a. Male UNDER 65: How often do you have five or more drinks on one occasion? 0 Filed at: 05/12/2025 1345   3b. FEMALE Any Age, or MALE 65+: How often do you have 4 or more drinks on one occassion? 0 Filed at: 05/12/2025 1345   Audit-C Score 0 Filed at: 05/12/2025 1345   ALYSON: How many times in the past year have you...    Used an illegal drug or used a prescription medication for non-medical reasons? Never Filed at: 05/12/2025 1345                            History of Present Illness       Chief Complaint   Patient presents with    Fall     Trip and fall over the weekend, +HS, -LOC,  -thinners, c/o left sided pelvic and groin pain, unable to bear weight on that side        Past Medical History:   Diagnosis Date    Asthma     Heartburn     Hypertension       Past Surgical History:   Procedure Laterality Date    HYSTERECTOMY      Feb 2021      No family history on file.   Social History     Tobacco Use    Smoking status: Former     Types: Cigarettes     Start date: 1990    Smokeless tobacco: Never   Vaping Use    Vaping status: Never Used   Substance Use Topics    Alcohol use: Yes     Alcohol/week: 1.0 standard drink of alcohol     Types: 1 Glasses of wine per week     Comment: ocassionally    Drug use: No      E-Cigarette/Vaping    E-Cigarette Use Never User       E-Cigarette/Vaping Substances      I have reviewed and agree with the history as documented.     Patient is a 64-year-old female with past medical history of asthma, hypertension, GERD, hysterectomy, presents to the emergency department complaining of left hip and buttock pain after she had a mechanical trip and fall last night.  Patient states that she tripped on her bath rug last night causing her to go flying and she reports she landed on her left side.  She did strike her head mildly on the left side but denies any loss of consciousness.  She states she was not having any headache up until today but attributes that to not eating or drinking much and having prolonged ED wait time.  She mostly complains of pain in her left buttocks and left groin that she states is a very deep pain and it is causing her to not be able to put weight on her left leg.  She states that she has been using crutches that she had in the house in order to avoid putting weight on it.  She has been taking Aleve with minimal relief.  Denies any radiation of the pain down the left leg.  She denies any leg weakness or paresthesia.  She denies any dizziness, near syncope, change in vision or hearing, neck pain, back pain, chest pain or pain with breathing, rib pain,  dyspnea, palpitations, abdominal pain or distention, pelvic pain, nausea, vomiting, difficulty urinating or having bowel movements, recent dysuria, hematuria, flank pain.  Denies any new focal neurologic deficits.  Denies taking any antiplatelet or anticoagulant agents.      History provided by:  Patient   used: No    Fall  Associated symptoms: headaches    Associated symptoms: no abdominal pain, no back pain, no chest pain, no hearing loss, no nausea, no neck pain and no vomiting        Review of Systems   Constitutional:  Negative for chills, fatigue and fever.   HENT:  Negative for dental problem, facial swelling, hearing loss, nosebleeds and tinnitus.    Eyes:  Negative for photophobia, pain and visual disturbance.   Respiratory:  Negative for cough, chest tightness, shortness of breath and wheezing.    Cardiovascular:  Negative for chest pain and palpitations.   Gastrointestinal:  Negative for abdominal distention, abdominal pain, constipation, diarrhea, nausea and vomiting.   Genitourinary:  Negative for difficulty urinating, dysuria, flank pain, frequency and hematuria.   Musculoskeletal:  Positive for gait problem. Negative for back pain, neck pain and neck stiffness.        +Left hip, buttocks, groin pain s/p fall.    Skin:  Negative for color change, pallor, rash and wound.   Allergic/Immunologic: Negative for immunocompromised state.   Neurological:  Positive for headaches. Negative for dizziness, syncope, facial asymmetry, speech difficulty, weakness, light-headedness and numbness.   Hematological:  Negative for adenopathy. Does not bruise/bleed easily.   Psychiatric/Behavioral:  Negative for confusion and decreased concentration.    All other systems reviewed and are negative.          Objective       ED Triage Vitals   Temperature Pulse Blood Pressure Respirations SpO2 Patient Position - Orthostatic VS   05/12/25 1344 05/12/25 1344 05/12/25 1344 05/12/25 1344 05/12/25 1344 05/12/25  1344   98.4 °F (36.9 °C) 98 (!) 188/105 19 96 % Standing      Temp Source Heart Rate Source BP Location FiO2 (%) Pain Score    05/12/25 1344 05/12/25 1344 05/12/25 1344 -- 05/12/25 1651    Temporal Monitor Left arm  10 - Worst Possible Pain      Vitals      Date and Time Temp Pulse SpO2 Resp BP Pain Score FACES Pain Rating User   05/12/25 1700 -- -- 98 % 18 156/88 10 - Worst Possible Pain -- SM   05/12/25 1651 -- -- -- -- -- 10 - Worst Possible Pain -- SM   05/12/25 1344 98.4 °F (36.9 °C) 98 96 % 19 188/105 -- -- GP            Physical Exam  Vitals and nursing note reviewed.   Constitutional:       General: She is not in acute distress.     Appearance: Normal appearance. She is well-developed. She is not ill-appearing, toxic-appearing or diaphoretic.   HENT:      Head: Normocephalic and atraumatic.      Right Ear: External ear normal.      Left Ear: External ear normal.      Nose: Nose normal.      Mouth/Throat:      Mouth: Mucous membranes are moist.      Pharynx: Oropharynx is clear.   Eyes:      Extraocular Movements: Extraocular movements intact.      Conjunctiva/sclera: Conjunctivae normal.      Pupils: Pupils are equal, round, and reactive to light.   Neck:      Vascular: No JVD.      Comments: No midline cervical spine tenderness.  Cardiovascular:      Rate and Rhythm: Normal rate and regular rhythm.      Heart sounds: Normal heart sounds. No murmur heard.     No friction rub. No gallop.   Pulmonary:      Effort: Pulmonary effort is normal. No respiratory distress.      Breath sounds: Normal breath sounds. No wheezing or rales.   Chest:      Chest wall: No tenderness.   Abdominal:      General: There is no distension.      Palpations: Abdomen is soft.      Tenderness: There is no abdominal tenderness. There is no guarding or rebound.   Musculoskeletal:         General: No swelling or tenderness. Normal range of motion.      Cervical back: Normal range of motion and neck supple. No rigidity or tenderness.       Comments: No midline cervical, thoracic or lumbar spine tenderness.  No step-offs.  Pelvis stable.      No reproducible tenderness in the left hip, groin or buttocks.  No obvious hematoma or contusion in the region.  Patient has decreased range of motion actively at the left hip due to pain.  No tenderness below the hip joint in the left lower extremity.  Full range of motion left ankle and knee joints.  2+ DP and PT pulses left lower extremity.    All other extremities are atraumatic, nontender and have full range of motion.   Skin:     General: Skin is warm and dry.      Coloration: Skin is not pale.      Findings: No erythema or rash.   Neurological:      General: No focal deficit present.      Mental Status: She is alert and oriented to person, place, and time.      Sensory: No sensory deficit.      Motor: No weakness.   Psychiatric:         Mood and Affect: Mood normal.         Behavior: Behavior normal.         Results Reviewed       None            XR femur 2 views LEFT   ED Interpretation by Yumiko Sifuentes DO (05/12 1651)   Suspect LEFT inferior pubic rami fracture and possible superior pubic rami fracture.      CT head without contrast   Final Interpretation by Michael Long MD (05/12 0532)      No acute intracranial abnormality.                  Workstation performed: [id number]         CT pelvis wo contrast   Final Interpretation by Karen Olivarez MD (05/12 1032)      Acute fractures of the left superior and inferior pubic rami.      Workstation performed: [id number]             Procedures    ED Medication and Procedure Management   Prior to Admission Medications   Prescriptions Last Dose Informant Patient Reported? Taking?   Fluticasone-Salmeterol (Advair Diskus) 250-50 mcg/dose inhaler   No No   Sig: Inhale 1 puff 2 (two) times a day Rinse mouth after use.   albuterol (PROVENTIL HFA,VENTOLIN HFA) 90 mcg/act inhaler   No No   Sig: Inhale 2 puffs every 4 (four) hours as needed for  shortness of breath   buPROPion (WELLBUTRIN XL) 150 mg 24 hr tablet   Yes No   Sig: Take 1 tablet by mouth every morning   fluticasone (FLONASE) 50 mcg/act nasal spray   No No   Si spray into each nostril daily   Patient not taking: Reported on 2025   multivitamin IVPB   Yes No   Sig: Take 1 tablet by mouth in the morning   olmesartan-hydrochlorothiazide (BENICAR HCT) 20-12.5 MG per tablet   No No   Sig: Take 1 tablet by mouth daily   rosuvastatin (CRESTOR) 5 mg tablet   Yes No   Sig: Take 1 tablet by mouth in the morning   Patient not taking: Reported on 2025      Facility-Administered Medications: None     Discharge Medication List as of 2025  5:49 PM        START taking these medications    Details   oxyCODONE (Roxicodone) 5 immediate release tablet Take 1 tablet (5 mg total) by mouth every 6 (six) hours as needed for severe pain for up to 10 days Max Daily Amount: 20 mg, Starting Mon 2025, Until 2025 at 2359, Normal           CONTINUE these medications which have NOT CHANGED    Details   albuterol (PROVENTIL HFA,VENTOLIN HFA) 90 mcg/act inhaler Inhale 2 puffs every 4 (four) hours as needed for shortness of breath, Starting 2023, Normal      buPROPion (WELLBUTRIN XL) 150 mg 24 hr tablet Take 1 tablet by mouth every morning, Starting 2025, Until Sat 2025, Historical Med      fluticasone (FLONASE) 50 mcg/act nasal spray 1 spray into each nostril daily, Starting 2022, No Print      Fluticasone-Salmeterol (Advair Diskus) 250-50 mcg/dose inhaler Inhale 1 puff 2 (two) times a day Rinse mouth after use., Starting 2022, Normal      multivitamin IVPB Take 1 tablet by mouth in the morning, Historical Med      olmesartan-hydrochlorothiazide (BENICAR HCT) 20-12.5 MG per tablet Take 1 tablet by mouth daily, Starting 2025, Normal      rosuvastatin (CRESTOR) 5 mg tablet Take 1 tablet by mouth in the morning, Starting Wed 8/10/2022, Historical Med              ED SEPSIS DOCUMENTATION   Time reflects when diagnosis was documented in both MDM as applicable and the Disposition within this note       Time User Action Codes Description Comment    5/12/2025  5:47 PM Yumiko Sifuentes [W01.0XXA] Fall on same level from tripping     5/12/2025  5:47 PM Yumiko Sifuentes [S32.512A] Closed fracture of superior ramus of left pubis, initial encounter (HCC)     5/12/2025  5:47 PM Yumiko Sifuentes [S32.592A] Closed fracture of inferior pubic ramus, left, initial encounter (HCC)                  Yumiko Sifuentes DO  05/12/25 1822

## 2025-05-25 DIAGNOSIS — S32.512D CLOSED FRACTURE OF SUPERIOR RAMUS OF LEFT PUBIS WITH ROUTINE HEALING, SUBSEQUENT ENCOUNTER: Primary | ICD-10-CM

## 2025-05-29 ENCOUNTER — APPOINTMENT (OUTPATIENT)
Dept: RADIOLOGY | Facility: CLINIC | Age: 65
End: 2025-05-29
Attending: ORTHOPAEDIC SURGERY
Payer: COMMERCIAL

## 2025-05-29 ENCOUNTER — OFFICE VISIT (OUTPATIENT)
Dept: OBGYN CLINIC | Facility: CLINIC | Age: 65
End: 2025-05-29
Payer: COMMERCIAL

## 2025-05-29 VITALS — HEIGHT: 66 IN | BODY MASS INDEX: 32.91 KG/M2

## 2025-05-29 DIAGNOSIS — S32.512D CLOSED FRACTURE OF SUPERIOR RAMUS OF LEFT PUBIS WITH ROUTINE HEALING, SUBSEQUENT ENCOUNTER: Primary | ICD-10-CM

## 2025-05-29 DIAGNOSIS — S32.512D CLOSED FRACTURE OF SUPERIOR RAMUS OF LEFT PUBIS WITH ROUTINE HEALING, SUBSEQUENT ENCOUNTER: ICD-10-CM

## 2025-05-29 PROBLEM — S32.512A CLOSED FRACTURE OF LEFT SUPERIOR RIM OF PUBIS (HCC): Status: ACTIVE | Noted: 2025-05-29

## 2025-05-29 PROCEDURE — 72190 X-RAY EXAM OF PELVIS: CPT

## 2025-05-29 PROCEDURE — 99203 OFFICE O/P NEW LOW 30 MIN: CPT | Performed by: ORTHOPAEDIC SURGERY

## 2025-05-29 NOTE — PROGRESS NOTES
"Name: Elva Lee      : 1960       MRN: [id number]   Encounter Provider: Lloyd Scott MD   Encounter Date: 25  Encounter department: [facility] [place]         Assessment & Plan  Closed fracture of superior ramus of left pubis with routine healing, subsequent encounter  X-rays obtained and reviewed  Begin calcium and vitamin D supplementation today  WBAT bl LE  PT referral placed for gait training, core, bl LE strengthening  Follow up with PCP for DXA scan  OTC anaglesics as needed  Follow up 4 weeks  Orders:    Ambulatory Referral to Physical Therapy; Future         To Do Next Visit:  XR pelvis    _____________________________________________________  CHIEF COMPLAINT:  Chief Complaint   Patient presents with    Pelvis - Pain         SUBJECTIVE:  Elva Lee is a 64 y.o. female who presents for evaluation of left pelvis fracture DOI: 2025. The patient had a fall on 5/10/2025 when she slipped on a rug, landing on her left side. She was seen in the ED the next day when CT scan was performed. Today, the patient reports her pain is improving. She has been NWB with crutch assistance. She is managing pain with tylenol and advil. Pain in left groin area.     PAST MEDICAL HISTORY:  Past Medical History[1]    PAST SURGICAL HISTORY:  Past Surgical History[2]    FAMILY HISTORY:  Family History[3]    SOCIAL HISTORY:  Social History[4]    MEDICATIONS:  Current Medications[5]    ALLERGIES:  Allergies[6]    LABS:  HgA1c:   Lab Results   Component Value Date    HGBA1C 5.8 (H) 2024     BMP:   Lab Results   Component Value Date    CALCIUM 9.0 2024    K 4.0 2024    CO2 28 2024     2024    BUN 16 2024    CREATININE 0.87 2024     CBC: No components found for: \"CBC\"    _____________________________________________________  PHYSICAL EXAMINATION:  Vital signs: Ht 5' 5.5\" (1.664 m)   LMP  (LMP Unknown)   BMI 32.91 kg/m² "   General: No acute distress, awake and alert  Psychiatric: Mood and affect appear appropriate  HEENT: Trachea Midline, No torticollis, no apparent facial trauma  Cardiovascular: No audible murmurs; Extremities appear perfused  Pulmonary: No audible wheezing or stridor  Skin: No open lesions; see further details (if any) below    MUSCULOSKELETAL EXAMINATION:  Extremities:    Left Hip:  Range of motion 110 degrees hip flexion  No tenderness to palpation  able to perform straight leg raise  negative log roll  Sensation intact to L2-S1 dermatomes   Extremity warm and well perfused         _____________________________________________________  STUDIES REVIEWED:  I personally reviewed the images obtained in office today and my independent interpretation is as follows:  Xrays of the pelvis obtained 5/29/2025 demonstrate left inferior pubic rami fracture. Healing in satisfactory position      PROCEDURES PERFORMED:  Procedures      Scribe Attestation      I,:  Amanda Jacobson am acting as a scribe while in the presence of the attending physician.:       I,:  Lloyd Scott MD personally performed the services described in this documentation    as scribed in my presence.:                  [1]   Past Medical History:  Diagnosis Date    Asthma     Heartburn     Hypertension    [2]   Past Surgical History:  Procedure Laterality Date    HYSTERECTOMY      Feb 2021   [3] No family history on file.  [4]   Social History  Tobacco Use    Smoking status: Former     Types: Cigarettes     Start date: 1990    Smokeless tobacco: Never   Vaping Use    Vaping status: Never Used   Substance Use Topics    Alcohol use: Yes     Alcohol/week: 1.0 standard drink of alcohol     Types: 1 Glasses of wine per week     Comment: ocassionally    Drug use: No   [5]   Current Outpatient Medications:     albuterol (PROVENTIL HFA,VENTOLIN HFA) 90 mcg/act inhaler, Inhale 2 puffs every 4 (four) hours as needed for shortness of breath, Disp: 18 g, Rfl: 1     Fluticasone-Salmeterol (Advair Diskus) 250-50 mcg/dose inhaler, Inhale 1 puff 2 (two) times a day Rinse mouth after use., Disp: 60 blister, Rfl: 11    multivitamin IVPB, Take 1 tablet by mouth in the morning, Disp: , Rfl:     olmesartan-hydrochlorothiazide (BENICAR HCT) 20-12.5 MG per tablet, Take 1 tablet by mouth daily, Disp: 30 tablet, Rfl: 0    buPROPion (WELLBUTRIN XL) 150 mg 24 hr tablet, Take 1 tablet by mouth every morning, Disp: , Rfl:     fluticasone (FLONASE) 50 mcg/act nasal spray, 1 spray into each nostril daily (Patient not taking: Reported on 5/29/2025), Disp: , Rfl:     rosuvastatin (CRESTOR) 5 mg tablet, Take 1 tablet by mouth in the morning (Patient not taking: Reported on 2/6/2025), Disp: , Rfl:   [6]   Allergies  Allergen Reactions    Amlodipine Swelling     Ankle edema    Atenolol Swelling    Lisinopril Swelling     Ankle edema

## 2025-05-30 NOTE — ASSESSMENT & PLAN NOTE
X-rays obtained and reviewed  Begin calcium and vitamin D supplementation today  WBAT bl LE  PT referral placed for gait training, core, bl LE strengthening  Follow up with PCP for DXA scan  OTC anaglesics as needed  Follow up 4 weeks  Orders:    Ambulatory Referral to Physical Therapy; Future

## 2025-06-16 ENCOUNTER — EVALUATION (OUTPATIENT)
Dept: PHYSICAL THERAPY | Facility: CLINIC | Age: 65
End: 2025-06-16
Attending: ORTHOPAEDIC SURGERY
Payer: COMMERCIAL

## 2025-06-16 DIAGNOSIS — S32.512D CLOSED FRACTURE OF SUPERIOR RAMUS OF LEFT PUBIS WITH ROUTINE HEALING, SUBSEQUENT ENCOUNTER: ICD-10-CM

## 2025-06-16 PROCEDURE — 97110 THERAPEUTIC EXERCISES: CPT

## 2025-06-16 PROCEDURE — 97161 PT EVAL LOW COMPLEX 20 MIN: CPT

## 2025-06-16 NOTE — PROGRESS NOTES
PT Evaluation     Today's date: 2025  Patient name: Elva Lee  : 1960  MRN: [id number]  Referring provider: Lloyd Scott MD  Dx:   Encounter Diagnosis     ICD-10-CM    1. Closed fracture of superior ramus of left pubis with routine healing, subsequent encounter  S32.512D Ambulatory Referral to Physical Therapy          Start Time: 1800  Stop Time: 1840  Total time in clinic (min): 40 minutes    Assessment  Impairments: activity intolerance, impaired physical strength, pain with function, safety issue, weight-bearing intolerance and unable to perform ADL    Assessment details: Pt is a 63 y/o female who comes to physical therapy after having a fall and fracturing her pelvis on . She has been WBAT and using crutches since. Pt presents with pain, activity intolerance and difficulty with ADLs due to pain, and strength and ROM deficits of L. hip. Pt's FOTO score at evaluation was 47, indicating impaired function and tolerance to ADLs. Pt reports PMH of HBP that is 'not controlled well', though she is taking medication for it. Pt's BP at eval. was 196/122, and 190/112 5 minutes after. Pt was educated about if BP is elevated that high at start of PT sessions, PT cannot treat due to safety concerns. Pt would benefit from physical therapy to decrease pain, increase strength and ROM, and return to PLOF. Pt treated by CATHY Groves, under direct PT supervision.         Goals  STGs, 4 wks :  1. Pt will increase L. hip strength by half a grade in flexors and abductors.  2. Pt will increase L. Hip flexion PROM from 110* to 130*.  3. Pt will increase L. Hip abduction PROM from 35* to 40*.  4. Pt's top pain level will decrease from 8/10 to 5/10.    LTGs, 8 wks :  1. Pt's top pain level will decrease to <4/10, for improved ADL and activity tolerance.  2. Pt will ambulate with least restrictive AD.   3. Pt will increase L. Hip strength to 5/5 in all directions for improved ADL and activity  tolerance.  4. Pt will increase FOTO score to reach goal of 67, for improved ADL and activity tolerance.      Plan  Patient would benefit from: skilled physical therapy and PT eval  Planned modality interventions: TENS    Planned therapy interventions: balance/weight bearing training, functional ROM exercises, therapeutic exercise, therapeutic activities and neuromuscular re-education    Frequency: 1x week  Duration in weeks: 8  Plan of Care beginning date: 2025  Plan of Care expiration date: 2025        Subjective Evaluation    History of Present Illness  Mechanism of injury: Pt is a 63 y/o female who comes to physical therapy after having a fall and fracturing her pelvis on . She has been WBAT and using crutches since. She reports achy pain in her groin, medial thigh, and gluteal region with top pain 8/10 and lowest pain 4/10. She reports her pain is aggravated by putting weight into her L. leg and ADLs like putting on pants. Pt reports she has no stairs at home. Pt reports taking Advil and Tylenol for her pain, initially taking it more than she is now, and reports she does not need it every day anymore. Pt has PMH of HBP and reports it is not controlled well, though she is taking medication for it. She reports her 'normal' is around 185/. Pt's BP prior to objectives was 196/122 and 190/112 5 minutes after. Pt was educated about if BP is elevated that high at start of PT sessions, PT cannot treat due to safety concerns. Pt treated by CATHY Groves, under direct PT supervision.       Pain  At best pain ratin  At worst pain ratin          Objective     General Comments:      Hip Comments   L. Hip AROM:  Flexion WNL & pain free    L. Hip PROM:  Flexion = 110, pain free  Abduction = 35, pain free  Extension = pain at medial thigh/groin    L. Quad activation = good    L. SLR = pain in groin again    L. Hip Strength:  Flexors = 3+  Abductors = pain groin, 4-     Special:  ANMOL = (+)  painful in groin  90-90 = (-)                   POC expires Unit limit Auth Expiration date PT/OT/ST + Visit Limit?   8/11/25 4 pending 30 PCY                           Visit/Unit Tracking  AUTH Status:  Date               pending Used                Remaining                    Diagnosis: s/p pelvis fracture    Precautions:    POC Expires: 8/11/25   Re-evaluation Date: 7/14/25   FOTO Scores/Date: Goal - 67; 47 (eval 6/16)   Visit Count 1/10       Manuals                                Ther Ex                                                                                Neuro Re-Ed                                                               Ther Act                                                                                 Modalities

## 2025-06-18 ENCOUNTER — APPOINTMENT (OUTPATIENT)
Dept: PHYSICAL THERAPY | Facility: CLINIC | Age: 65
End: 2025-06-18
Attending: ORTHOPAEDIC SURGERY
Payer: COMMERCIAL

## 2025-06-21 DIAGNOSIS — S32.512D CLOSED FRACTURE OF SUPERIOR RAMUS OF LEFT PUBIS WITH ROUTINE HEALING, SUBSEQUENT ENCOUNTER: Primary | ICD-10-CM

## 2025-06-25 ENCOUNTER — OFFICE VISIT (OUTPATIENT)
Dept: PHYSICAL THERAPY | Facility: CLINIC | Age: 65
End: 2025-06-25
Attending: ORTHOPAEDIC SURGERY
Payer: COMMERCIAL

## 2025-06-25 DIAGNOSIS — S32.512D CLOSED FRACTURE OF SUPERIOR RAMUS OF LEFT PUBIS WITH ROUTINE HEALING, SUBSEQUENT ENCOUNTER: Primary | ICD-10-CM

## 2025-06-25 PROCEDURE — 97110 THERAPEUTIC EXERCISES: CPT

## 2025-06-25 NOTE — PROGRESS NOTES
"Daily Note     Today's date: 2025  Patient name: Elva Lee  : 1960  MRN: [id number]  Referring provider: Lloyd Scott MD  Dx:   Encounter Diagnosis     ICD-10-CM    1. Closed fracture of superior ramus of left pubis with routine healing, subsequent encounter  S32.512D           Start Time: 1642  Stop Time: 1720  Total time in clinic (min): 38 minutes    Subjective: Pt reports being able to walk more and using crutches less.       Objective: See treatment diary below      Assessment: Pt's BP start of session: 192/102 mmHg & after 1 min. 190/98 mmHg. Pt tolerated tx fair, and performed all exercises with good form, requiring minimal verbal and tactile cueing. Pt reported LBP after laying supine ~10-15 min. and didn't tolerate glute bridges. PT should raise head of table next session. Pt demonstrated good depth during squats at bar, and PT can progress to low mat next session. Pt reported increased soreness by end of session, but no pain. Pt would benefit from continued PT, and PT will progress next session as able. Pt treated by CATHY Groves, under direct PT supervision.       Plan: Continue per plan of care.        POC expires Unit limit Auth Expiration date PT/OT/ST + Visit Limit?   25 4 pending 30 PCY                           Visit/Unit Tracking  AUTH Status:  Date               pending Used                Remaining                    Diagnosis: s/p pelvis fracture    Precautions:    POC Expires: 25   Re-evaluation Date: 25   FOTO Scores/Date: Goal - 67; 47 (eval )   Visit Count 10 2/10      Manuals                                Ther Ex                Glute sets HEP       Supine marches HEP       Hip Add. HEP 10\" 2x10              Bike   3 min      Hip Abd.  Rtb 5\" 2x10      SLR  L. only x10      Glute Bridge  Nt       Squats  At bar x15      Pallof press  Rtb x15 bilat.      Weight shifts to L @ bar  x20 3\"              Neuro Re-Ed                    "                                            Ther Act                                                                                 Modalities

## 2025-06-26 ENCOUNTER — OFFICE VISIT (OUTPATIENT)
Dept: OBGYN CLINIC | Facility: CLINIC | Age: 65
End: 2025-06-26
Payer: COMMERCIAL

## 2025-06-26 ENCOUNTER — APPOINTMENT (OUTPATIENT)
Dept: RADIOLOGY | Facility: CLINIC | Age: 65
End: 2025-06-26
Attending: ORTHOPAEDIC SURGERY
Payer: COMMERCIAL

## 2025-06-26 VITALS — WEIGHT: 200 LBS | RESPIRATION RATE: 18 BRPM | HEIGHT: 66 IN | BODY MASS INDEX: 32.14 KG/M2

## 2025-06-26 DIAGNOSIS — S32.512D CLOSED FRACTURE OF SUPERIOR RAMUS OF LEFT PUBIS WITH ROUTINE HEALING, SUBSEQUENT ENCOUNTER: ICD-10-CM

## 2025-06-26 DIAGNOSIS — S32.512D CLOSED FRACTURE OF SUPERIOR RAMUS OF LEFT PUBIS WITH ROUTINE HEALING, SUBSEQUENT ENCOUNTER: Primary | ICD-10-CM

## 2025-06-26 PROCEDURE — 99213 OFFICE O/P EST LOW 20 MIN: CPT | Performed by: ORTHOPAEDIC SURGERY

## 2025-06-26 PROCEDURE — 72190 X-RAY EXAM OF PELVIS: CPT

## 2025-06-26 RX ORDER — NALTREXONE HYDROCHLORIDE 50 MG/1
TABLET, FILM COATED ORAL
COMMUNITY
Start: 2025-04-01

## 2025-06-26 RX ORDER — TOPIRAMATE 25 MG/1
TABLET, FILM COATED ORAL
COMMUNITY
Start: 2025-06-03 | End: 2025-07-03

## 2025-06-26 RX ORDER — LOSARTAN POTASSIUM 100 MG/1
100 TABLET ORAL DAILY
COMMUNITY

## 2025-06-26 NOTE — PATIENT INSTRUCTIONS
Weight bearing as tolerated left lower extremity   Transition from crutches to cane   Continue physical therapy as directed     Over the counter analgesics as needed / directed   Ice / heat as directed   Follow up PRN

## 2025-06-26 NOTE — ASSESSMENT & PLAN NOTE
Left superior and inferior pubic rami fractures,   DOI 5/12/25   Improved symptoms overall   Weight bearing as tolerated left lower extremity   Transition from crutches to cane   Continue physical therapy as directed     Over the counter analgesics as needed / directed   Ice / heat as directed   Follow up PRN     Orders:    Cane

## 2025-06-26 NOTE — PROGRESS NOTES
Name: Elva Lee      : 1960       MRN: [id number]   Encounter Provider: Lloyd Scott MD   Encounter Date: 25  Encounter department: [facility] [place]       Assessment & Plan  Closed fracture of superior ramus of left pubis with routine healing, subsequent encounter  Left superior and inferior pubic rami fractures,   DOI 25   Improved symptoms overall   Weight bearing as tolerated left lower extremity   Transition from crutches to cane   Continue physical therapy as directed     Over the counter analgesics as needed / directed   Ice / heat as directed   Follow up PRN     Orders:    Cane    To Do Next Visit:  PRN     _____________________________________________________  CHIEF COMPLAINT:  Chief Complaint   Patient presents with    Pelvis - Pain         SUBJECTIVE:  Elva Lee is a 64 y.o. female who presents to the office for a follow evaluation of her pelvis.  Patient is 5 weeks status post closed treatment of left superior and inferior pubic rami fractures.  Patient states that she is doing well overall.  Patient notes decreased pain overall in comparison to previous examination.  Patient has been ambulate with use of crutches but states that she has been trialing full weightbearing without the use of crutches.  Patient denies any numbness or tingling in the leg.  Patient has been attending physical therapy currently.  Patient has not been taking any type pain medication.  Patient offers no other complaints at this time.    PAST MEDICAL HISTORY:  Past Medical History[1]    PAST SURGICAL HISTORY:  Past Surgical History[2]    FAMILY HISTORY:  Family History[3]    SOCIAL HISTORY:  Social History[4]    MEDICATIONS:  Current Medications[5]    ALLERGIES:  Allergies[6]    LABS:  HgA1c:   Lab Results   Component Value Date    HGBA1C 5.8 (H) 2024     BMP:   Lab Results   Component Value Date    CALCIUM 9.0 2024    K 4.0 2024    CO2 28  "12/13/2024     12/13/2024    BUN 16 12/13/2024    CREATININE 0.87 12/13/2024     CBC: No components found for: \"CBC\"    _____________________________________________________  PHYSICAL EXAMINATION:  Vital signs: Resp 18   Ht 5' 5.5\" (1.664 m)   Wt 90.7 kg (200 lb)   LMP  (LMP Unknown)   BMI 32.78 kg/m²   General: No acute distress, awake and alert  Psychiatric: Mood and affect appear appropriate  HEENT: Trachea Midline, No torticollis, no apparent facial trauma  Cardiovascular: No audible murmurs; Extremities appear perfused  Pulmonary: No audible wheezing or stridor  Skin: No open lesions; see further details (if any) below      MUSCULOSKELETAL EXAMINATION:  Bilateral LE  Patient sitting comfortably in the office in no apparent distress   No acute visible abnormalities present  No bony or soft tissue tenderness to palpation noted at this time.  Patient able to actively forward flex hip to approximately 100 degrees.  Passive internal and external rotation to 45 degrees with minimal pain.  NV intact  _____________________________________________________  STUDIES REVIEWED:  I personally reviewed the images obtained in office today and my independent interpretation is as follows:  XR pelvis 3 views  Healing superior and inferior pubic rami fractures present      PROCEDURES PERFORMED:  No procedures were performed at this time.             Jean-Pierre Resendiz PA-C - assisting  Lloyd Scott MD           [1]   Past Medical History:  Diagnosis Date    Asthma     Heartburn     Hypertension    [2]   Past Surgical History:  Procedure Laterality Date    HYSTERECTOMY      Feb 2021   [3] No family history on file.  [4]   Social History  Tobacco Use    Smoking status: Former     Types: Cigarettes     Start date: 1990    Smokeless tobacco: Never   Vaping Use    Vaping status: Never Used   Substance Use Topics    Alcohol use: Yes     Alcohol/week: 1.0 standard drink of alcohol     Types: 1 Glasses of wine per week     " Comment: ocassionally    Drug use: No   [5]   Current Outpatient Medications:     albuterol (PROVENTIL HFA,VENTOLIN HFA) 90 mcg/act inhaler, Inhale 2 puffs every 4 (four) hours as needed for shortness of breath, Disp: 18 g, Rfl: 1    Fluticasone-Salmeterol (Advair Diskus) 250-50 mcg/dose inhaler, Inhale 1 puff 2 (two) times a day Rinse mouth after use., Disp: 60 blister, Rfl: 11    losartan (COZAAR) 100 MG tablet, Take 100 mg by mouth daily, Disp: , Rfl:     multivitamin IVPB, Take 1 tablet by mouth in the morning, Disp: , Rfl:     naltrexone (REVIA) 50 mg tablet, , Disp: , Rfl:     olmesartan-hydrochlorothiazide (BENICAR HCT) 20-12.5 MG per tablet, Take 1 tablet by mouth daily, Disp: 30 tablet, Rfl: 0    buPROPion (WELLBUTRIN XL) 150 mg 24 hr tablet, Take 1 tablet by mouth every morning, Disp: , Rfl:     fluticasone (FLONASE) 50 mcg/act nasal spray, 1 spray into each nostril daily (Patient not taking: Reported on 2/6/2025), Disp: , Rfl:     topiramate (TOPAMAX) 25 mg tablet, Take by mouth (Patient not taking: Reported on 6/26/2025), Disp: , Rfl:   [6]   Allergies  Allergen Reactions    Amlodipine Swelling     Ankle edema    Atenolol Swelling    Lisinopril Swelling     Ankle edema

## 2025-07-02 ENCOUNTER — APPOINTMENT (OUTPATIENT)
Dept: PHYSICAL THERAPY | Facility: CLINIC | Age: 65
End: 2025-07-02
Attending: ORTHOPAEDIC SURGERY
Payer: COMMERCIAL

## 2025-07-09 ENCOUNTER — OFFICE VISIT (OUTPATIENT)
Dept: PHYSICAL THERAPY | Facility: CLINIC | Age: 65
End: 2025-07-09
Attending: ORTHOPAEDIC SURGERY
Payer: COMMERCIAL

## 2025-07-09 DIAGNOSIS — S32.512D CLOSED FRACTURE OF SUPERIOR RAMUS OF LEFT PUBIS WITH ROUTINE HEALING, SUBSEQUENT ENCOUNTER: Primary | ICD-10-CM

## 2025-07-09 PROCEDURE — 97110 THERAPEUTIC EXERCISES: CPT

## 2025-07-09 NOTE — PROGRESS NOTES
"Daily Note     Today's date: 2025  Patient name: Elva Lee  : 1960  MRN: [id number]  Referring provider: Lloyd Scott MD  Dx:   Encounter Diagnosis     ICD-10-CM    1. Closed fracture of superior ramus of left pubis with routine healing, subsequent encounter  S32.512D           Start Time: 1630  Stop Time: 1712  Total time in clinic (min): 42 minutes    Subjective:  Pt reports 4-5/10 pain. Ambulates into clinic with b/l crutches but FWB on b/l LE's with minimal use of assistive device.      Objective: See treatment diary below      Assessment: Pt's BP was 190/118 mmHg, after 2 min 190/116 mmHg. No c/o lightheadedness or any other symptom of potential concern. Pt completed program with improved tolerance, added bridges and increased reps of exercises with good tolerance, minimal increase of pain noted by Pt that returned to baseline after completion. Weight shift time was increased to 5\" with good tolerance and adequately challenging Pt tolerance.      Plan: Continue per plan of care.  Progress treatment as tolerated.         POC expires Unit limit Auth Expiration date PT/OT/ST + Visit Limit?   25 4 pending 30 PCY                           Visit/Unit Tracking  AUTH Status:  Date               pending Used                Remaining                    Diagnosis: s/p pelvis fracture    Precautions:    POC Expires: 25   Re-evaluation Date: 25   FOTO Scores/Date: Goal - 67; 47 (eval )   Visit Count 110 2/10 3/10     Manuals   7/9                             Ther Ex   7/9             Glute sets HEP       Supine marches HEP       Hip Add. HEP 10\" 2x10 10\"x10             Bike   3 min 3 min no resistance     Hip Abd.  Rtb 5\" 2x10 Rtb 5\"2x10     SLR  L. only x10 L only x10     Glute Bridge  Nt  x10     Squats  At bar x15 @ bar 2x10     Pallof press  Rtb x15 bilat. Rtb 2x10 b/l     Weight shifts to L @ bar  x20 3\" 5\"x20             Neuro Re-Ed                                   "                             Ther Act                                                                                 Modalities

## 2025-07-16 ENCOUNTER — OFFICE VISIT (OUTPATIENT)
Dept: PHYSICAL THERAPY | Facility: CLINIC | Age: 65
End: 2025-07-16
Attending: ORTHOPAEDIC SURGERY
Payer: COMMERCIAL

## 2025-07-16 DIAGNOSIS — S32.512D CLOSED FRACTURE OF SUPERIOR RAMUS OF LEFT PUBIS WITH ROUTINE HEALING, SUBSEQUENT ENCOUNTER: Primary | ICD-10-CM

## 2025-07-16 PROCEDURE — 97110 THERAPEUTIC EXERCISES: CPT

## 2025-07-16 NOTE — PROGRESS NOTES
"Daily Note     Today's date: 2025  Patient name: Elva Lee  : 1960  MRN: [id number]  Referring provider: Lloyd Scott MD  Dx:   Encounter Diagnosis     ICD-10-CM    1. Closed fracture of superior ramus of left pubis with routine healing, subsequent encounter  S32.512D           Start Time: 1631  Stop Time: 1709  Total time in clinic (min): 38 minutes    Subjective: Pt reports 4/10 pain today in \"usual spot\". Reports being active today with ambulating and using the crutches towards end of day due to fatigue/pain.      Objective: See treatment diary below      Assessment:  BP was 190/110 mmHg at start of session. Pt completed program today within tolerance. Weight shift caused an increase of discomfort in pelvis with an increase noted at completion. Squats @ bar were completed with improved tolerance and less discomfort as noted by Pt. Upon completion of palloff press, Pt verbalized increased discomfort/pain while in standing and deferred further standing exercises. Pt tolerated an increase of reps to table exercises with good tolerance. Pt educated on importance of complying with HEP to further the recovery process between PT sessions, Pt verbalized understanding.       Plan: Continue per plan of care.  Progress treatment as tolerated.         POC expires Unit limit Auth Expiration date PT/OT/ST + Visit Limit?   25 4 pending 30 PCY                           Visit/Unit Tracking  AUTH Status:  Date               pending Used                Remaining                    Diagnosis: s/p pelvis fracture    Precautions:    POC Expires: 25   Re-evaluation Date: 25   FOTO Scores/Date: Goal - 67; 47 (eval )   Visit Count 1/10 2/10 3/10 4/10    Manuals                              Ther Ex              Glute sets HEP       Supine marches HEP   2x10    Hip Add. HEP 10\" 2x10 10\"x10 5\"x20            Bike   3 min 3 min no resistance 4 min no resistance    Hip Abd. " " Rtb 5\" 2x10 Rtb 5\"2x10 Rtb 5\"2x10    SLR  L. only x10 L only x10 L only x 10    Glute Bridge  Nt  x10  2x10x3\"    Squats  At bar x15 @ bar 2x10 @ bar 2x10    Pallof press  Rtb x15 bilat. Rtb 2x10 b/l Rtb 2x10 b/l    Weight shifts to L @ bar  x20 3\" 5\"x20 5\"x20    Heel raises    x20    Neuro Re-Ed                                                               Ther Act                                                                                 Modalities                                                "